# Patient Record
(demographics unavailable — no encounter records)

---

## 2024-10-18 NOTE — IMMUNIZATIONS
[Immunizations are up to date] : Immunizations are up to date [Records maintained by [facility]] : Records maintained by PETE [FreeTextEntry1] : Received flu vaccine on 09/19/24

## 2024-10-18 NOTE — END OF VISIT
[] : Fellow [FreeTextEntry3] : Agree with fellow as above.    I discussed this patient in a pre-clinic session with the fellow including review of clinical status, last labs, and relevant notes from other providers.  I also saw the patient and discussed history, completed an exam and discussed the treatment/management and follow-up together with the fellow.     [Time Spent: ___ minutes] : I have spent [unfilled] minutes of time on the encounter which excludes teaching and separately reported services.

## 2024-10-18 NOTE — REASON FOR VISIT
[Follow-Up: _____] : [unfilled] is  being seen for a [unfilled] follow-up visit [Patient] : patient [Mother] : mother [Interpreters_FullName] : Marleny Ham [Interpreters_Relationshiptopatient] : Physician [TWNoteComboBox1] : Malaysian

## 2024-10-18 NOTE — REVIEW OF SYSTEMS
[NI] : Endocrine [Nl] : Hematologic/Lymphatic [Joint Pains] : arthralgias [Joint Swelling] : joint swelling  [AM Stiffness] : am stiffness [Appropriate Age Development] : development appropriate for age [Fever] : no fever [Rash] : no rash [Eye Pain] : no eye pain [Redness] : no redness [Blurry Vision] : no blurred vision [Oral Ulcers] : no oral ulcers [Chest Pain] : no chest pain or discomfort [Shortness of Breath] : no shortness of breath [Change in Appetite] : no change in appetite [Vomiting] : no vomiting [Diarrhea] : no diarrhea [Abdominal Pain] : no abdominal pain [Constipation] : no constipation [Limping] : no limping [Back Pain] : ~T no back pain [Muscle Aches] : no muscle aches [Smokers in Home] : no one in home smokes

## 2024-10-18 NOTE — PHYSICAL EXAM
[PERRLA] : JAMIE [Lips] : normal lips [Oral] : normal oral cavity  [Mucosa] : moist and pink mucosa [Palate] : normal palate [S1, S2 Present] : S1, S2 present [Cardiac Auscultation] : normal cardiac auscultation  [Respiratory Effort] : normal respiratory effort [Clear to auscultation] : clear to auscultation [Soft] : soft [NonTender] : non tender [Non Distended] : non distended [Normal Bowel Sounds] : normal bowel sounds [No Hepatosplenomegaly] : no hepatosplenomegaly [No Abnormal Lymph Nodes Palpated] : no abnormal lymph nodes palpated [Range Of Motion] : full range of motion [Joint effusions] : joint effusions [Intact Judgement] : intact judgement  [Insight Insight] : intact insight [Not Examined] : not examined [5] : 5 [_______] : Ankle: [unfilled] [Acute distress] : no acute distress [Rash] : no rash [Erythematous Conjunctiva] : nonerythematous conjunctiva [Erythematous Oropharynx] : nonerythematous oropharynx [Ulcers] : no ulcers [Lesions] : no lesions [Erythematous] : not erythematous [Mass (___cm)] : no neck masses [Murmurs] : no murmurs [Peripheral Edema] : no peripheral edema  [de-identified] : effusion of multiple MCP and PIPs bilaterally, with tenderness.. stiffness of fingers on L hand. effusion of b/l knees and ankles.  nontender  [NumbJointsActiveArthritis] : 4 + multiple MCP and PIPs [de-identified] : non tender to palpation bilaterally [de-identified] : none

## 2024-10-18 NOTE — DATA REVIEWED
[FreeTextEntry1] : Labs September 2024 UPC 0.1 , UA normal  , CCP negative dsDNA , APLAs, Dejesus, SSB  negative  CRP, CMP normal  Sjogren's 7.3 RNP 3.6  C3(elevated), normal C4

## 2024-10-18 NOTE — CONSULT LETTER
[Dear  ___] : Dear  [unfilled], [Courtesy Letter:] : I had the pleasure of seeing your patient, [unfilled], in my office today. [Please see my note below.] : Please see my note below. [Consult Closing:] : Thank you very much for allowing me to participate in the care of this patient.  If you have any questions, please do not hesitate to contact me. [Sincerely,] : Sincerely, [FreeTextEntry2] : Guillermo Hutchinson MD  [address] [place], [place], [address] [FreeTextEntry3] : Marleny Ham MD Pediatric Rheumatology Fellow [facility]

## 2024-10-18 NOTE — REASON FOR VISIT
[Follow-Up: _____] : [unfilled] is  being seen for a [unfilled] follow-up visit [Patient] : patient [Mother] : mother [Interpreters_FullName] : Marleny Ham [Interpreters_Relationshiptopatient] : Physician [TWNoteComboBox1] : Malawian

## 2024-10-18 NOTE — HISTORY OF PRESENT ILLNESS
[Polyarticular RF Positive] : Polyarticular RF Positive [LEANN positive] : LEANN positive [RF positive] : RF positive [HLAB27 negative] : HLAB27 negative [No] : no glaucoma [Rheumatoid Arthritis] : Rheumatoid Arthritis [Unlimited ADLs] : able to do activities of daily living without limitations [Unlimited Sports] : able to participate in sports without limitations [4] : 4 [FreeTextEntry1] :  feels better but this week has had more pain in hands, knees and ankles. Worse on Tuesday lasting 2 days. Reports joint swelling throughout. Reports compliance with MTX and Naproxen. denies GI symptoms. AM stiffness 3 hours in the morning.  Currently has no pain.   No fever, rash, or recent illness.   No eye pain/redness/change in vision. Sometimes reports dry eyes.  Saw Optho on March 29, 2024 (mother does not recall info). No abnormalities except for astigmatism left eye as per mother. No oral ulcers. Deny alopecia or Raynaud's.  No difficulty chewing or swallowing. Denies dry mouth.   No chest pain or shortness of breath. Normal appetite.   No headaches.  No hematuria or blood in stool.   No other new symptoms.  [JIASubtypeDate] : 03/28/2024 [[facility] [Juvenile Rheumatoid Arthritis] : no Juvenile Rheumatoid Arthritis [Ankylosing Spondylitis] : no Ankylosing Spondylitis [Psoriasis] : no Psoriasis [Diabetes Mellitus (type 1 - insulin dependent)] : no Type 1 Diabetes Mellitus [Systemic Lupus Erythematosus] : no Systemic Lupus Erythematosus [Raynaud's Disease] : no Raynaud's Disease [IBD - Crohns] : no Crohn's Inflammatory Bowel disease [IBD - Ulcerative Colitis] : no Ulcerative Colitis Inflammatory Bowel Disease [Graves' Disease] : no Graves' Disease [Hashimoto's Thyroiditis] : no Hashimoto's Thyroiditis [Multiple Sclerosis] : no Multiple Sclerosis [de-identified] : PGM, Rheumatism in [facility], possible SLE. DiGeorge Syndrome- Younger brother, mother had similar symptoms

## 2024-10-18 NOTE — CONSULT LETTER
[Dear  ___] : Dear  [unfilled], [Courtesy Letter:] : I had the pleasure of seeing your patient, [unfilled], in my office today. [Please see my note below.] : Please see my note below. [Consult Closing:] : Thank you very much for allowing me to participate in the care of this patient.  If you have any questions, please do not hesitate to contact me. [Sincerely,] : Sincerely, [FreeTextEntry2] : Guillermo Hutchinson MD  [address] [place], [address] [FreeTextEntry3] : Marleny Ham MD Pediatric Rheumatology Fellow [facility]

## 2024-10-18 NOTE — HISTORY OF PRESENT ILLNESS
[Polyarticular RF Positive] : Polyarticular RF Positive [LEANN positive] : LEANN positive [RF positive] : RF positive [HLAB27 negative] : HLAB27 negative [No] : no glaucoma [Rheumatoid Arthritis] : Rheumatoid Arthritis [Unlimited ADLs] : able to do activities of daily living without limitations [Unlimited Sports] : able to participate in sports without limitations [4] : 4 [FreeTextEntry1] :  feels better but this week has had more pain in hands, knees and ankles. Worse on Tuesday lasting 2 days. Reports joint swelling throughout. Reports compliance with MTX and Naproxen. denies GI symptoms. AM stiffness 3 hours in the morning.  Currently has no pain.   No fever, rash, or recent illness.   No eye pain/redness/change in vision. Sometimes reports dry eyes.  Saw Optho on March 29, 2024 (mother does not recall info). No abnormalities except for astigmatism left eye as per mother. No oral ulcers. Deny alopecia or Raynaud's.  No difficulty chewing or swallowing. Denies dry mouth.   No chest pain or shortness of breath. Normal appetite.   No headaches.  No hematuria or blood in stool.   No other new symptoms.  [JIASubtypeDate] : 03/28/2024 [[facility] 60 [Juvenile Rheumatoid Arthritis] : no Juvenile Rheumatoid Arthritis [Ankylosing Spondylitis] : no Ankylosing Spondylitis [Psoriasis] : no Psoriasis [Diabetes Mellitus (type 1 - insulin dependent)] : no Type 1 Diabetes Mellitus [Systemic Lupus Erythematosus] : no Systemic Lupus Erythematosus [Raynaud's Disease] : no Raynaud's Disease [IBD - Crohns] : no Crohn's Inflammatory Bowel disease [IBD - Ulcerative Colitis] : no Ulcerative Colitis Inflammatory Bowel Disease [Graves' Disease] : no Graves' Disease [Hashimoto's Thyroiditis] : no Hashimoto's Thyroiditis [Multiple Sclerosis] : no Multiple Sclerosis [de-identified] : PGM, Rheumatism in [facility], possible SLE. DiGeorge Syndrome- Younger brother, mother had similar symptoms

## 2024-10-18 NOTE — PHYSICAL EXAM
[PERRLA] : JAMIE [Lips] : normal lips [Oral] : normal oral cavity  [Mucosa] : moist and pink mucosa [Palate] : normal palate [S1, S2 Present] : S1, S2 present [Cardiac Auscultation] : normal cardiac auscultation  [Respiratory Effort] : normal respiratory effort [Clear to auscultation] : clear to auscultation [Soft] : soft [NonTender] : non tender [Non Distended] : non distended [Normal Bowel Sounds] : normal bowel sounds [No Hepatosplenomegaly] : no hepatosplenomegaly [No Abnormal Lymph Nodes Palpated] : no abnormal lymph nodes palpated [Range Of Motion] : full range of motion [Joint effusions] : joint effusions [Intact Judgement] : intact judgement  [Insight Insight] : intact insight [Not Examined] : not examined [5] : 5 [_______] : Ankle: [unfilled] [Acute distress] : no acute distress [Rash] : no rash [Erythematous Conjunctiva] : nonerythematous conjunctiva [Erythematous Oropharynx] : nonerythematous oropharynx [Ulcers] : no ulcers [Lesions] : no lesions [Erythematous] : not erythematous [Mass (___cm)] : no neck masses [Murmurs] : no murmurs [Peripheral Edema] : no peripheral edema  [de-identified] : effusion of multiple MCP and PIPs bilaterally, with tenderness.. stiffness of fingers on L hand. effusion of b/l knees and ankles.  nontender  [NumbJointsActiveArthritis] : 4 + multiple MCP and PIPs [de-identified] : non tender to palpation bilaterally [de-identified] : none

## 2024-11-26 NOTE — REASON FOR VISIT
[Follow-Up: _____] : [unfilled] is  being seen for a [unfilled] follow-up visit [Patient] : patient [Mother] : mother [Interpreters_FullName] : Marleny Ham [Interpreters_Relationshiptopatient] : Physician [TWNoteComboBox1] : Mauritanian

## 2024-11-26 NOTE — HISTORY OF PRESENT ILLNESS
[Polyarticular RF Positive] : Polyarticular RF Positive [LEANN positive] : LEANN positive [RF positive] : RF positive [HLAB27 negative] : HLAB27 negative [No] : no glaucoma [Rheumatoid Arthritis] : Rheumatoid Arthritis [Unlimited ADLs] : able to do activities of daily living without limitations [Unlimited Sports] : able to participate in sports without limitations [0] : 0 [FreeTextEntry1] : Denies joint pain. Takes MTX on Sundays. Tolerating well without GI complaints. She is also on Naproxen daily. No joint swelling or AM stiffness.   No fever, rash, or recent illness.   No eye pain/redness/change in vision. Sometimes reports dry eyes.  Scheduled for optho follow up March 2025. No abnormalities except for astigmatism left eye as per mother. Needs to bring records.  No oral ulcers. Deny alopecia or Raynaud's.  No difficulty chewing or swallowing. Sometimes reports dry mouth.  No chest pain or shortness of breath. Normal appetite.  No headaches.  No hematuria or blood in stool.   No other new symptoms.   Optho appt March 2025 PMD appt on 12/10  [JIASubtypeDate] : 03/28/2024 [[facility] : [facility] [DurMorningStiffness] : 0 [Juvenile Rheumatoid Arthritis] : no Juvenile Rheumatoid Arthritis [Ankylosing Spondylitis] : no Ankylosing Spondylitis [Psoriasis] : no Psoriasis [Diabetes Mellitus (type 1 - insulin dependent)] : no Type 1 Diabetes Mellitus [Systemic Lupus Erythematosus] : no Systemic Lupus Erythematosus [Raynaud's Disease] : no Raynaud's Disease [IBD - Crohns] : no Crohn's Inflammatory Bowel disease [IBD - Ulcerative Colitis] : no Ulcerative Colitis Inflammatory Bowel Disease [Graves' Disease] : no Graves' Disease [Hashimoto's Thyroiditis] : no Hashimoto's Thyroiditis [Multiple Sclerosis] : no Multiple Sclerosis [de-identified] : PGM, Rheumatism in [facility], possible SLE. DiGeorge Syndrome- Younger brother, mother had similar symptoms

## 2024-11-26 NOTE — CONSULT LETTER
[Dear  ___] : Dear  [unfilled], [Courtesy Letter:] : I had the pleasure of seeing your patient, [unfilled], in my office today. [Please see my note below.] : Please see my note below. [Consult Closing:] : Thank you very much for allowing me to participate in the care of this patient.  If you have any questions, please do not hesitate to contact me. [Sincerely,] : Sincerely, [FreeTextEntry2] : Guillermo Hutchinson MD  [address] [place], [address] [FreeTextEntry3] : Marleny Ham MD Pediatric Rheumatology Fellow [facility]

## 2024-11-26 NOTE — CONSULT LETTER
[Dear  ___] : Dear  [unfilled], [Courtesy Letter:] : I had the pleasure of seeing your patient, [unfilled], in my office today. [Please see my note below.] : Please see my note below. [Consult Closing:] : Thank you very much for allowing me to participate in the care of this patient.  If you have any questions, please do not hesitate to contact me. [Sincerely,] : Sincerely, [FreeTextEntry2] : Guillermo Hutchinson MD  [address] [place], [address] [FreeTextEntry3] : Marleny Ham MD Pediatric Rheumatology Fellow [facility]

## 2024-11-26 NOTE — SOCIAL HISTORY
[Parent(s)] : parent(s) [___ Brothers] : [unfilled] brothers [___ Sisters] : [unfilled] sisters [Grade:  _____] : Grade: [unfilled] [FreeTextEntry1] : [employer]

## 2024-11-26 NOTE — SOCIAL HISTORY
[Parent(s)] : parent(s) [___ Brothers] : [unfilled] brothers [___ Sisters] : [unfilled] sisters [Grade:  _____] : Grade: [unfilled] [FreeTextEntry1] : [employer]

## 2024-11-26 NOTE — REASON FOR VISIT
Xray Chest 1 View- PORTABLE-Urgent [Follow-Up: _____] : [unfilled] is  being seen for a [unfilled] follow-up visit [Patient] : patient [Mother] : mother [Interpreters_FullName] : Marleny Ham [Interpreters_Relationshiptopatient] : Physician [TWNoteComboBox1] : English

## 2024-11-26 NOTE — PHYSICAL EXAM
[PERRLA] : JAMIE [Lips] : normal lips [Oral] : normal oral cavity  [Mucosa] : moist and pink mucosa [Palate] : normal palate [S1, S2 Present] : S1, S2 present [Cardiac Auscultation] : normal cardiac auscultation  [Respiratory Effort] : normal respiratory effort [Clear to auscultation] : clear to auscultation [Soft] : soft [NonTender] : non tender [Non Distended] : non distended [Normal Bowel Sounds] : normal bowel sounds [No Hepatosplenomegaly] : no hepatosplenomegaly [No Abnormal Lymph Nodes Palpated] : no abnormal lymph nodes palpated [Range Of Motion] : full range of motion [Joint effusions] : joint effusions [Intact Judgement] : intact judgement  [Insight Insight] : intact insight [Not Examined] : not examined [_______] : Ankle: [unfilled] [3] : 3 [Acute distress] : no acute distress [Rash] : no rash [Erythematous Conjunctiva] : nonerythematous conjunctiva [Erythematous Oropharynx] : nonerythematous oropharynx [Ulcers] : no ulcers [Lesions] : no lesions [Erythematous] : not erythematous [Mass (___cm)] : no neck masses [Murmurs] : no murmurs [Peripheral Edema] : no peripheral edema  [2] : 2 [de-identified] : effusion of multiple MCP and PIPs bilaterally, nontender [improved]. small effusion of b/l ankles.  nontender , full ROM of all joints  [NumbJointsActiveArthritis] : 2+  multiple MCP and PIPs [NumbJointsLimitedMotion] : 0 [de-identified] : non tender to palpation bilaterally [de-identified] : none

## 2024-11-26 NOTE — PHYSICAL EXAM
[PERRLA] : JAMIE [Lips] : normal lips [Oral] : normal oral cavity  [Mucosa] : moist and pink mucosa [Palate] : normal palate [S1, S2 Present] : S1, S2 present [Cardiac Auscultation] : normal cardiac auscultation  [Respiratory Effort] : normal respiratory effort [Clear to auscultation] : clear to auscultation [Soft] : soft [NonTender] : non tender [Non Distended] : non distended [Normal Bowel Sounds] : normal bowel sounds [No Hepatosplenomegaly] : no hepatosplenomegaly [No Abnormal Lymph Nodes Palpated] : no abnormal lymph nodes palpated [Range Of Motion] : full range of motion [Joint effusions] : joint effusions [Intact Judgement] : intact judgement  [Insight Insight] : intact insight [Not Examined] : not examined [_______] : Ankle: [unfilled] [3] : 3 [Acute distress] : no acute distress [Rash] : no rash [Erythematous Conjunctiva] : nonerythematous conjunctiva [Erythematous Oropharynx] : nonerythematous oropharynx [Ulcers] : no ulcers [Lesions] : no lesions [Erythematous] : not erythematous [Mass (___cm)] : no neck masses [Murmurs] : no murmurs [Peripheral Edema] : no peripheral edema  [2] : 2 [de-identified] : effusion of multiple MCP and PIPs bilaterally, nontender [improved]. small effusion of b/l ankles.  nontender , full ROM of all joints  [NumbJointsActiveArthritis] : 2+  multiple MCP and PIPs [NumbJointsLimitedMotion] : 0 [de-identified] : non tender to palpation bilaterally [de-identified] : none

## 2024-11-26 NOTE — HISTORY OF PRESENT ILLNESS
[Polyarticular RF Positive] : Polyarticular RF Positive [LEANN positive] : LEANN positive [RF positive] : RF positive [HLAB27 negative] : HLAB27 negative [No] : no glaucoma [Rheumatoid Arthritis] : Rheumatoid Arthritis [Unlimited ADLs] : able to do activities of daily living without limitations [Unlimited Sports] : able to participate in sports without limitations [0] : 0 [FreeTextEntry1] : Denies joint pain. Takes MTX on Sundays. Tolerating well without GI complaints. She is also on Naproxen daily. No joint swelling or AM stiffness.   No fever, rash, or recent illness.   No eye pain/redness/change in vision. Sometimes reports dry eyes.  Scheduled for optho follow up March 2025. No abnormalities except for astigmatism left eye as per mother. Needs to bring records.  No oral ulcers. Deny alopecia or Raynaud's.  No difficulty chewing or swallowing. Sometimes reports dry mouth.  No chest pain or shortness of breath. Normal appetite.  No headaches.  No hematuria or blood in stool.   No other new symptoms.   Optho appt March 2025 PMD appt on 12/10  [JIASubtypeDate] : 03/28/2024 [[facility] [DurMorningStiffness] : 0 [Juvenile Rheumatoid Arthritis] : no Juvenile Rheumatoid Arthritis [Ankylosing Spondylitis] : no Ankylosing Spondylitis [Psoriasis] : no Psoriasis [Diabetes Mellitus (type 1 - insulin dependent)] : no Type 1 Diabetes Mellitus [Systemic Lupus Erythematosus] : no Systemic Lupus Erythematosus [Raynaud's Disease] : no Raynaud's Disease [IBD - Crohns] : no Crohn's Inflammatory Bowel disease [IBD - Ulcerative Colitis] : no Ulcerative Colitis Inflammatory Bowel Disease [Graves' Disease] : no Graves' Disease [Hashimoto's Thyroiditis] : no Hashimoto's Thyroiditis [Multiple Sclerosis] : no Multiple Sclerosis [de-identified] : PGM, Rheumatism in [facility], possible SLE. DiGeorge Syndrome- Younger brother, mother had similar symptoms

## 2024-11-26 NOTE — REVIEW OF SYSTEMS
[NI] : Endocrine [Appropriate Age Development] : development appropriate for age [Nl] : Musculoskeletal [Fever] : no fever [Rash] : no rash [Eye Pain] : no eye pain [Redness] : no redness [Blurry Vision] : no blurred vision [Oral Ulcers] : no oral ulcers [Chest Pain] : no chest pain or discomfort [Shortness of Breath] : no shortness of breath [Change in Appetite] : no change in appetite [Vomiting] : no vomiting [Diarrhea] : no diarrhea [Abdominal Pain] : no abdominal pain [Constipation] : no constipation [Limping] : no limping [Joint Pains] : no arthralgias [Joint Swelling] : no joint swelling [Back Pain] : ~T no back pain [Muscle Aches] : no muscle aches [AM Stiffness] : no am stiffness [Smokers in Home] : no one in home smokes

## 2025-01-17 NOTE — PHYSICAL EXAM
[PERRLA] : JAMIE [Lips] : normal lips [Oral] : normal oral cavity  [Mucosa] : moist and pink mucosa [Palate] : normal palate [S1, S2 Present] : S1, S2 present [Cardiac Auscultation] : normal cardiac auscultation  [Respiratory Effort] : normal respiratory effort [Clear to auscultation] : clear to auscultation [Soft] : soft [NonTender] : non tender [Non Distended] : non distended [Normal Bowel Sounds] : normal bowel sounds [No Hepatosplenomegaly] : no hepatosplenomegaly [No Abnormal Lymph Nodes Palpated] : no abnormal lymph nodes palpated [Range Of Motion] : full range of motion [Joint effusions] : joint effusions [Intact Judgement] : intact judgement  [Insight Insight] : intact insight [Not Examined] : not examined [2] : 2 [_______] : Ankle: [unfilled] [1] : 1 [Acute distress] : no acute distress [Rash] : no rash [Erythematous Conjunctiva] : nonerythematous conjunctiva [Erythematous Oropharynx] : nonerythematous oropharynx [Ulcers] : no ulcers [Lesions] : no lesions [Erythematous] : not erythematous [Mass (___cm)] : no neck masses [Murmurs] : no murmurs [Peripheral Edema] : no peripheral edema  [de-identified] : synovial hypertrophy of multiple MCP and PIPs bilaterally, nontender, small effusion of b/l ankles.  nontender , full ROM of all joints  [NumbJointsActiveArthritis] : 2 [NumbJointsLimitedMotion] : 0 [de-identified] : none [de-identified] : non tender to palpation bilaterally

## 2025-01-17 NOTE — HISTORY OF PRESENT ILLNESS
[Polyarticular RF Positive] : Polyarticular RF Positive [LEANN positive] : LEANN positive [RF positive] : RF positive [HLAB27 negative] : HLAB27 negative [No] : no glaucoma [0] : 0 [Rheumatoid Arthritis] : Rheumatoid Arthritis [Unlimited ADLs] : able to do activities of daily living without limitations [Unlimited Sports] : able to participate in sports without limitations [FreeTextEntry1] : Denies joint pain. Takes MTX on Sundays. Tolerating well without GI complaints. Was using Naproxen daily. this week has not used it. No joint swelling or AM stiffness.   No fever, rash, or recent illness.   No eye pain/redness/change in vision. Sometimes reports dry eyes with itching. has tried artificial drops when she has discomfort with relief.   Scheduled for optho([facility] ophthalmology in [place]) follow up March 2025. No abnormalities except for astigmatism left eye as per mother. Needs to bring records.  No oral ulcers. Deny alopecia or Raynaud's.  No difficulty chewing or swallowing. Sometimes reports dry mouth.  No chest pain or shortness of breath. Normal appetite.  No headaches.  No hematuria or blood in stool.   No other new symptoms.   Optho appt March 2025  Current meds MTX 25mg qweekly (on Sundays) Leucovorin 5mg weekly  HCQ 300mg daily  [JIASubtypeDate] : 03/28/2024 [[facility] [DurMorningStiffness] : 0 [Juvenile Rheumatoid Arthritis] : no Juvenile Rheumatoid Arthritis [Ankylosing Spondylitis] : no Ankylosing Spondylitis [Psoriasis] : no Psoriasis [Diabetes Mellitus (type 1 - insulin dependent)] : no Type 1 Diabetes Mellitus [Systemic Lupus Erythematosus] : no Systemic Lupus Erythematosus [Raynaud's Disease] : no Raynaud's Disease [IBD - Crohns] : no Crohn's Inflammatory Bowel disease [IBD - Ulcerative Colitis] : no Ulcerative Colitis Inflammatory Bowel Disease [Graves' Disease] : no Graves' Disease [Multiple Sclerosis] : no Multiple Sclerosis [Hashimoto's Thyroiditis] : no Hashimoto's Thyroiditis [de-identified] : PGM, Rheumatism in [facility], possible SLE. DiGeorge Syndrome- Younger brother, mother had similar symptoms

## 2025-01-17 NOTE — REVIEW OF SYSTEMS
[NI] : Endocrine [Nl] : Hematologic/Lymphatic [Appropriate Age Development] : development appropriate for age [Fever] : no fever [Rash] : no rash [Eye Pain] : no eye pain [Redness] : no redness [Blurry Vision] : no blurred vision [Oral Ulcers] : no oral ulcers [Chest Pain] : no chest pain or discomfort [Shortness of Breath] : no shortness of breath [Change in Appetite] : no change in appetite [Vomiting] : no vomiting [Diarrhea] : no diarrhea [Abdominal Pain] : no abdominal pain [Constipation] : no constipation [Limping] : no limping [Joint Pains] : no arthralgias [Joint Swelling] : no joint swelling [Back Pain] : ~T no back pain [Muscle Aches] : no muscle aches [AM Stiffness] : no am stiffness [Smokers in Home] : no one in home smokes

## 2025-01-17 NOTE — CONSULT LETTER
[Dear  ___] : Dear  [unfilled], [Courtesy Letter:] : I had the pleasure of seeing your patient, [unfilled], in my office today. [Please see my note below.] : Please see my note below. [Consult Closing:] : Thank you very much for allowing me to participate in the care of this patient.  If you have any questions, please do not hesitate to contact me. [Sincerely,] : Sincerely, [FreeTextEntry2] : Guillermo Hutchinson MD  [address] [place], [place], [address] [FreeTextEntry3] : Marleny Ham MD Pediatric Rheumatology Fellow [facility]

## 2025-01-17 NOTE — REASON FOR VISIT
[Follow-Up: _____] : [unfilled] is  being seen for a [unfilled] follow-up visit [Patient] : patient [Mother] : mother [Interpreters_FullName] : Marleny Ham [Interpreters_Relationshiptopatient] : Physician [TWNoteComboBox1] : Gibraltarian

## 2025-01-17 NOTE — REASON FOR VISIT
[Follow-Up: _____] : [unfilled] is  being seen for a [unfilled] follow-up visit [Patient] : patient [Mother] : mother [Interpreters_FullName] : Marleny Ham [Interpreters_Relationshiptopatient] : Physician [TWNoteComboBox1] : Ethiopian

## 2025-01-17 NOTE — SOCIAL HISTORY
[Parent(s)] : parent(s) [___ Brothers] : [unfilled] brothers [___ Sisters] : [unfilled] sisters [Grade:  _____] : Grade: [unfilled] [FreeTextEntry1] : [employer]

## 2025-01-17 NOTE — SOCIAL HISTORY
[Parent(s)] : parent(s) [___ Brothers] : [unfilled] brothers [___ Sisters] : [unfilled] sisters [Grade:  _____] : Grade: [unfilled] [FreeTextEntry1] : [employer]

## 2025-01-17 NOTE — PHYSICAL EXAM
[PERRLA] : JAMIE [Lips] : normal lips [Oral] : normal oral cavity  [Mucosa] : moist and pink mucosa [Palate] : normal palate [S1, S2 Present] : S1, S2 present [Cardiac Auscultation] : normal cardiac auscultation  [Respiratory Effort] : normal respiratory effort [Clear to auscultation] : clear to auscultation [Soft] : soft [NonTender] : non tender [Non Distended] : non distended [Normal Bowel Sounds] : normal bowel sounds [No Hepatosplenomegaly] : no hepatosplenomegaly [No Abnormal Lymph Nodes Palpated] : no abnormal lymph nodes palpated [Range Of Motion] : full range of motion [Joint effusions] : joint effusions [Intact Judgement] : intact judgement  [Insight Insight] : intact insight [Not Examined] : not examined [2] : 2 [_______] : Ankle: [unfilled] [1] : 1 [Acute distress] : no acute distress [Rash] : no rash [Erythematous Conjunctiva] : nonerythematous conjunctiva [Erythematous Oropharynx] : nonerythematous oropharynx [Ulcers] : no ulcers [Lesions] : no lesions [Erythematous] : not erythematous [Mass (___cm)] : no neck masses [Murmurs] : no murmurs [Peripheral Edema] : no peripheral edema  [de-identified] : synovial hypertrophy of multiple MCP and PIPs bilaterally, nontender, small effusion of b/l ankles.  nontender , full ROM of all joints  [NumbJointsActiveArthritis] : 2 [NumbJointsLimitedMotion] : 0 [de-identified] : none [de-identified] : non tender to palpation bilaterally

## 2025-01-17 NOTE — HISTORY OF PRESENT ILLNESS
[Polyarticular RF Positive] : Polyarticular RF Positive [LEANN positive] : LEANN positive [RF positive] : RF positive [HLAB27 negative] : HLAB27 negative [No] : no glaucoma [0] : 0 [Rheumatoid Arthritis] : Rheumatoid Arthritis [Unlimited ADLs] : able to do activities of daily living without limitations [Unlimited Sports] : able to participate in sports without limitations [FreeTextEntry1] : Denies joint pain. Takes MTX on Sundays. Tolerating well without GI complaints. Was using Naproxen daily. this week has not used it. No joint swelling or AM stiffness.   No fever, rash, or recent illness.   No eye pain/redness/change in vision. Sometimes reports dry eyes with itching. has tried artificial drops when she has discomfort with relief.   Scheduled for optho([facility] ophthalmology in [place]) follow up March 2025. No abnormalities except for astigmatism left eye as per mother. Needs to bring records.  No oral ulcers. Deny alopecia or Raynaud's.  No difficulty chewing or swallowing. Sometimes reports dry mouth.  No chest pain or shortness of breath. Normal appetite.  No headaches.  No hematuria or blood in stool.   No other new symptoms.   Optho appt March 2025  Current meds MTX 25mg qweekly (on Sundays) Leucovorin 5mg weekly  HCQ 300mg daily  [JIASubtypeDate] : 03/28/2024 [[facility] : [facility] [DurMorningStiffness] : 0 [Juvenile Rheumatoid Arthritis] : no Juvenile Rheumatoid Arthritis [Ankylosing Spondylitis] : no Ankylosing Spondylitis [Psoriasis] : no Psoriasis [Diabetes Mellitus (type 1 - insulin dependent)] : no Type 1 Diabetes Mellitus [Systemic Lupus Erythematosus] : no Systemic Lupus Erythematosus [Raynaud's Disease] : no Raynaud's Disease [IBD - Crohns] : no Crohn's Inflammatory Bowel disease [IBD - Ulcerative Colitis] : no Ulcerative Colitis Inflammatory Bowel Disease [Graves' Disease] : no Graves' Disease [Hashimoto's Thyroiditis] : no Hashimoto's Thyroiditis [Multiple Sclerosis] : no Multiple Sclerosis [de-identified] : PGM, Rheumatism in [facility], possible SLE. DiGeorge Syndrome- Younger brother, mother had similar symptoms

## 2025-01-17 NOTE — CONSULT LETTER
[Dear  ___] : Dear  [unfilled], [Courtesy Letter:] : I had the pleasure of seeing your patient, [unfilled], in my office today. [Please see my note below.] : Please see my note below. [Consult Closing:] : Thank you very much for allowing me to participate in the care of this patient.  If you have any questions, please do not hesitate to contact me. [Sincerely,] : Sincerely, [FreeTextEntry2] : Guillermo Hutchinson MD  [address] [place], [address] [FreeTextEntry3] : Marleny Ham MD Pediatric Rheumatology Fellow [facility]

## 2025-03-02 NOTE — CONSULT LETTER
[Dear  ___] : Dear  [unfilled], [Courtesy Letter:] : I had the pleasure of seeing your patient, [unfilled], in my office today. [Please see my note below.] : Please see my note below. [Consult Closing:] : Thank you very much for allowing me to participate in the care of this patient.  If you have any questions, please do not hesitate to contact me. [Sincerely,] : Sincerely, [FreeTextEntry2] : Ileana Yanes MD  [address] [place] [address]  [FreeTextEntry3] : Marleny Ham MD Pediatric Rheumatology Fellow [facility]

## 2025-03-02 NOTE — PHYSICAL EXAM
[PERRLA] : JAMIE [Lips] : normal lips [Oral] : normal oral cavity  [Mucosa] : moist and pink mucosa [Palate] : normal palate [S1, S2 Present] : S1, S2 present [Cardiac Auscultation] : normal cardiac auscultation  [Respiratory Effort] : normal respiratory effort [Clear to auscultation] : clear to auscultation [Soft] : soft [NonTender] : non tender [Non Distended] : non distended [Normal Bowel Sounds] : normal bowel sounds [No Hepatosplenomegaly] : no hepatosplenomegaly [No Abnormal Lymph Nodes Palpated] : no abnormal lymph nodes palpated [Range Of Motion] : full range of motion [Joint effusions] : joint effusions [Intact Judgement] : intact judgement  [Insight Insight] : intact insight [Not Examined] : not examined [1] : 1 [_______] : Ankle: [unfilled] [Acute distress] : no acute distress [Rash] : no rash [Erythematous Conjunctiva] : nonerythematous conjunctiva [Erythematous Oropharynx] : nonerythematous oropharynx [Ulcers] : no ulcers [Lesions] : no lesions [Erythematous] : not erythematous [Mass (___cm)] : no neck masses [Murmurs] : no murmurs [Peripheral Edema] : no peripheral edema  [de-identified] : synovial hypertrophy of multiple MCP and PIPs bilaterally, nontender, small effusion of b/l ankles.  nontender , full ROM of all joints  [NumbJointsActiveArthritis] : 2 [NumbJointsLimitedMotion] : 0 [de-identified] : non tender to palpation bilaterally [de-identified] : none

## 2025-03-02 NOTE — REASON FOR VISIT
[Follow-Up: _____] : [unfilled] is  being seen for a [unfilled] follow-up visit [Patient] : patient [Mother] : mother [Interpreters_FullName] : Marleny Ham [Interpreters_Relationshiptopatient] : Physician [TWNoteComboBox1] : [place]an

## 2025-03-02 NOTE — SOCIAL HISTORY
[Parent(s)] : parent(s) [___ Brothers] : [unfilled] brothers [___ Sisters] : [unfilled] sisters [Grade:  _____] : Grade: [unfilled] [FreeTextEntry1] : [employer]

## 2025-03-02 NOTE — REASON FOR VISIT
[Follow-Up: _____] : [unfilled] is  being seen for a [unfilled] follow-up visit [Patient] : patient [Mother] : mother [Interpreters_FullName] : Marleny Ham [Interpreters_Relationshiptopatient] : Physician [TWNoteComboBox1] : [place]orese

## 2025-03-02 NOTE — PHYSICAL EXAM
[PERRLA] : JAMIE [Lips] : normal lips [Oral] : normal oral cavity  [Mucosa] : moist and pink mucosa [Palate] : normal palate [S1, S2 Present] : S1, S2 present [Cardiac Auscultation] : normal cardiac auscultation  [Respiratory Effort] : normal respiratory effort [Clear to auscultation] : clear to auscultation [Soft] : soft [NonTender] : non tender [Non Distended] : non distended [Normal Bowel Sounds] : normal bowel sounds [No Hepatosplenomegaly] : no hepatosplenomegaly [No Abnormal Lymph Nodes Palpated] : no abnormal lymph nodes palpated [Range Of Motion] : full range of motion [Joint effusions] : joint effusions [Intact Judgement] : intact judgement  [Insight Insight] : intact insight [Not Examined] : not examined [1] : 1 [_______] : Ankle: [unfilled] [Acute distress] : no acute distress [Rash] : no rash [Erythematous Conjunctiva] : nonerythematous conjunctiva [Erythematous Oropharynx] : nonerythematous oropharynx [Ulcers] : no ulcers [Lesions] : no lesions [Erythematous] : not erythematous [Mass (___cm)] : no neck masses [Murmurs] : no murmurs [Peripheral Edema] : no peripheral edema  [de-identified] : synovial hypertrophy of multiple MCP and PIPs bilaterally, nontender, small effusion of b/l ankles.  nontender , full ROM of all joints  [NumbJointsActiveArthritis] : 2 [NumbJointsLimitedMotion] : 0 [de-identified] : non tender to palpation bilaterally [de-identified] : none

## 2025-03-02 NOTE — CONSULT LETTER
[Dear  ___] : Dear  [unfilled], [Courtesy Letter:] : I had the pleasure of seeing your patient, [unfilled], in my office today. [Please see my note below.] : Please see my note below. [Consult Closing:] : Thank you very much for allowing me to participate in the care of this patient.  If you have any questions, please do not hesitate to contact me. [Sincerely,] : Sincerely, [FreeTextEntry2] : Ileana Yanes MD  [address] [place] [address]  [FreeTextEntry3] : Marleny Ham MD Pediatric Rheumatology Fellow [facility]

## 2025-03-02 NOTE — END OF VISIT
[] : Fellow [FreeTextEntry3] : MCTD.  PFT grossly wnl per pulmonary (see scanned records). Labs for disease and drug toxicity monitoring as ordered. Plan well outlined per Dr Ham above.  I discussed this patient in a pre-clinic session with the fellow including clinical status and last set of laboratory testing results. I also saw the patient and discussed history, completed an exam and discussed the plan together with the fellow. Total time spent today included reviewing prior notes, results, and time with patient/parent. Time spent teaching and/or on separate procedures was not counted toward this total time. Time spent -   41   minutes

## 2025-03-02 NOTE — SOCIAL HISTORY
[Parent(s)] : parent(s) [___ Brothers] : [unfilled] brothers [___ Sisters] : [unfilled] sisters [Grade:  _____] : Grade: [unfilled] [FreeTextEntry1] : [employer]

## 2025-03-02 NOTE — HISTORY OF PRESENT ILLNESS
[Polyarticular RF Positive] : Polyarticular RF Positive [LEANN positive] : LEANN positive [RF positive] : RF positive [HLAB27 negative] : HLAB27 negative [No] : no glaucoma [0] : 0 [Rheumatoid Arthritis] : Rheumatoid Arthritis [Unlimited ADLs] : able to do activities of daily living without limitations [Unlimited Sports] : able to participate in sports without limitations [FreeTextEntry1] : Denies joint pain. Takes MTX on weekends.  Tolerating well without GI complaints. Was using Naproxen PRN. No joint swelling or AM stiffness.   No fever, rash, or recent illness.   No eye pain/redness/change in vision. Denies dry eyes or dry mouth.   Scheduled for optho([facility] ophthalmology in [place]) follow up March 2025. No abnormalities except for astigmatism left eye as per mother. Needs to bring records.  No oral ulcers. Deny alopecia or Raynaud's.  No difficulty chewing or swallowing.   No chest pain or shortness of breath. Normal appetite.  No headaches.  No hematuria or blood in stool.   No other new symptoms.   Optho appt March 2025 went to pul today for PFTs - results pending.   Current meds MTX 25mg qweekly (on weekends) Leucovorin 5mg weekly  HCQ 300mg daily  [JIASubtypeDate] : 03/28/2024 [[facility] : [facility] [DurMorningStiffness] : 0 [Juvenile Rheumatoid Arthritis] : no Juvenile Rheumatoid Arthritis [Ankylosing Spondylitis] : no Ankylosing Spondylitis [Psoriasis] : no Psoriasis [Diabetes Mellitus (type 1 - insulin dependent)] : no Type 1 Diabetes Mellitus [Systemic Lupus Erythematosus] : no Systemic Lupus Erythematosus [Raynaud's Disease] : no Raynaud's Disease [IBD - Crohns] : no Crohn's Inflammatory Bowel disease [IBD - Ulcerative Colitis] : no Ulcerative Colitis Inflammatory Bowel Disease [Graves' Disease] : no Graves' Disease [Hashimoto's Thyroiditis] : no Hashimoto's Thyroiditis [Multiple Sclerosis] : no Multiple Sclerosis [de-identified] : PGM, Rheumatism in [facility], possible SLE. DiGeorge Syndrome- Younger brother, mother had similar symptoms

## 2025-03-02 NOTE — HISTORY OF PRESENT ILLNESS
[Polyarticular RF Positive] : Polyarticular RF Positive [LEANN positive] : LEANN positive [RF positive] : RF positive [HLAB27 negative] : HLAB27 negative [No] : no glaucoma [0] : 0 [Rheumatoid Arthritis] : Rheumatoid Arthritis [Unlimited ADLs] : able to do activities of daily living without limitations [Unlimited Sports] : able to participate in sports without limitations [FreeTextEntry1] : Denies joint pain. Takes MTX on weekends.  Tolerating well without GI complaints. Was using Naproxen PRN. No joint swelling or AM stiffness.   No fever, rash, or recent illness.   No eye pain/redness/change in vision. Denies dry eyes or dry mouth.   Scheduled for optho([facility] ophthalmology in [place]) follow up March 2025. No abnormalities except for astigmatism left eye as per mother. Needs to bring records.  No oral ulcers. Deny alopecia or Raynaud's.  No difficulty chewing or swallowing.   No chest pain or shortness of breath. Normal appetite.  No headaches.  No hematuria or blood in stool.   No other new symptoms.   Optho appt March 2025 went to pul today for PFTs - results pending.   Current meds MTX 25mg qweekly (on weekends) Leucovorin 5mg weekly  HCQ 300mg daily  [JIASubtypeDate] : 03/28/2024 [[facility] : [facility] [DurMorningStiffness] : 0 [Juvenile Rheumatoid Arthritis] : no Juvenile Rheumatoid Arthritis [Ankylosing Spondylitis] : no Ankylosing Spondylitis [Psoriasis] : no Psoriasis [Diabetes Mellitus (type 1 - insulin dependent)] : no Type 1 Diabetes Mellitus [Systemic Lupus Erythematosus] : no Systemic Lupus Erythematosus [Raynaud's Disease] : no Raynaud's Disease [IBD - Crohns] : no Crohn's Inflammatory Bowel disease [IBD - Ulcerative Colitis] : no Ulcerative Colitis Inflammatory Bowel Disease [Graves' Disease] : no Graves' Disease [Hashimoto's Thyroiditis] : no Hashimoto's Thyroiditis [Multiple Sclerosis] : no Multiple Sclerosis [de-identified] : PGM, Rheumatism in [facility], possible SLE. DiGeorge Syndrome- Younger brother, mother had similar symptoms

## 2025-03-19 NOTE — PHYSICAL EXAM
[General Appearance - Alert] : alert [General Appearance - In No Acute Distress] : in no acute distress [General Appearance - Well Nourished] : well nourished [General Appearance - Well Developed] : well developed [General Appearance - Well-Appearing] : well appearing [Appearance Of Head] : the head was normocephalic [Facies] : there were no dysmorphic facial features [Sclera] : the conjunctiva were normal [Outer Ear] : the ears and nose were normal in appearance [Examination Of The Oral Cavity] : mucous membranes were moist and pink [Auscultation Breath Sounds / Voice Sounds] : breath sounds clear to auscultation bilaterally [Normal Chest Appearance] : the chest was normal in appearance [Apical Impulse] : quiet precordium with normal apical impulse [Heart Rate And Rhythm] : normal heart rate and rhythm [Heart Sounds] : normal S1 and S2 [No Murmur] : no murmurs  [Heart Sounds Gallop] : no gallops [Heart Sounds Pericardial Friction Rub] : no pericardial rub [Edema] : no edema [Arterial Pulses] : normal upper and lower extremity pulses with no pulse delay [Heart Sounds Click] : no clicks [Capillary Refill Test] : normal capillary refill [Bowel Sounds] : normal bowel sounds [Abdomen Soft] : soft [Nondistended] : nondistended [Abdomen Tenderness] : non-tender [Nail Clubbing] : no clubbing  or cyanosis of the fingers [Musculoskeletal - Swelling] : no joint swelling or joint tenderness [Cervical Lymph Nodes Enlarged Anterior] : The anterior cervical nodes were normal [Cervical Lymph Nodes Enlarged Posterior] : The posterior cervical nodes were normal [] : no rash [Skin Lesions] : no lesions [Skin Turgor] : normal turgor [Demonstrated Behavior - Infant Nonreactive To Parents] : interactive [Mood] : mood and affect were appropriate for age [Demonstrated Behavior] : normal behavior

## 2025-03-19 NOTE — REASON FOR VISIT
[Initial Consultation] : an initial consultation for [Noncardiac Disease] : cardiovascular evaluation  [Patient] : patient [Mother] : mother [Language Line ] : provided by Language Line   [Time Spent: ____ minutes] : Total time spent using  services: [unfilled] minutes. The patient's primary language is not English thus required  services. [Interpreters_IDNumber] : [id number] [Interpreters_FullName] : ginger [TWNoteComboBox1] : Omani

## 2025-03-19 NOTE — HISTORY OF PRESENT ILLNESS
[FreeTextEntry1] : I had the opportunity to examine Yeny, a 14-year-old female with juvenile idiopathic arthritis referred for cardiac evaluation.  Her medications are listed below and consists of methotrexate, hydroxy chloroquine sulfate, and leucovorin.  There are no known allergies.  She denies any cardiovascular complaints such as chest pain, cyanosis, chronic cough, excessive sweating, exercise intolerance, or syncope.  She is currently in the ninth grade.  Her serology is positive for LEANN, SSA, rheumatoid factor.  She was born in [facility] after full-term pregnancy normal spontaneous vaginal livery of breath 3 and 4 kg.  Father 40 mother 45 are both in good health is a 23-year-old female sibling in the in good health is an 8-year-old brother with DiGeorge syndrome without cardiac involvement.  She is currently in the ninth grade.

## 2025-03-19 NOTE — CARDIOLOGY SUMMARY
[Today's Date] : [unfilled] [FreeTextEntry1] : Sinus rhythm, rate 71 bpm, QRS axis was 91 degrees, ID 0.15, QRS 0.08, QTc 0.42 seconds and is within normal limits. [FreeTextEntry2] : Summary:  1. Acceleration of left pulmonary artery flow velocity to 2 m/sec without discrete narrowing in the     vessel.     The LPA measures mildly hypoplastic for body surface area by limited imaging. 2. Normal right ventricular morphology with qualitatively normal size and systolic function. 3. Normal left ventricular size, morphology and systolic function. 4. Aortic sinuses of Valsalva dimension (systole) = 2.42 cm (z = -1.53). 5. Ascending aorta dimension (systole) = 2.6 cm (z = -0.02). 6. The ascending aorta in cross section (PSAX) at the level of the right pulmonary artery measures:      2.6 cm. 7. No pericardial effusion

## 2025-03-19 NOTE — CONSULT LETTER
[Today's Date] : [unfilled] [Name] : Name: [unfilled] [] : : ~~ [Today's Date:] : [unfilled] [Dear  ___:] : Dear Dr. [unfilled]: [Consult - Single Provider] : Thank you very much for allowing me to participate in the care of this patient. If you have any questions, please do not hesitate to contact me. [Sincerely,] : Sincerely, [DrKatiuska  ___] : Dr. DAILY [FreeTextEntry4] : Dr. Jorge Yanes [FreeTextEntry5] : [id number] [FreeTextEntry6] : [place] [address] [de-identified] : Tariq Borjas MD, FAAP, FACC, FAHA Chief Emeritus, Division of Pediatric Cardiology [facility] Professor, Department of Pediatrics, [facility]

## 2025-03-19 NOTE — CONSULT LETTER
[Today's Date] : [unfilled] [Name] : Name: [unfilled] [] : : ~~ [Today's Date:] : [unfilled] [Dear  ___:] : Dear Dr. [unfilled]: [Consult - Single Provider] : Thank you very much for allowing me to participate in the care of this patient. If you have any questions, please do not hesitate to contact me. [Sincerely,] : Sincerely, [DrKatiuska  ___] : Dr. DAILY [FreeTextEntry4] : Dr. Jorge Yanes [FreeTextEntry5] : [id number] [FreeTextEntry6] : [place] [address] [de-identified] : Tariq Borjas MD, FAAP, FACC, FAHA Chief Emeritus, Division of Pediatric Cardiology [facility] Professor, Department of Pediatrics, [facility]

## 2025-03-19 NOTE — HISTORY OF PRESENT ILLNESS
[FreeTextEntry1] : I had the opportunity to examine Yeny, a 14-year-old female with juvenile idiopathic arthritis referred for cardiac evaluation.  Her medications are listed below and consists of methotrexate, hydroxy chloroquine sulfate, and leucovorin.  There are no known allergies.  She denies any cardiovascular complaints such as chest pain, cyanosis, chronic cough, excessive sweating, exercise intolerance, or syncope.  She is currently in the ninth grade.  Her serology is positive for LEANN, SSA, rheumatoid factor.  She was born in [facility] after full-term pregnancy normal spontaneous vaginal livery of breath 3 and 4 kg.  Father 40 mother 45 are both in good health is a 23-year-old female sibling in the in good health is an 8-year-old brother with DiGeorge syndrome without cardiac involvement.  She is currently in the ninth grade.

## 2025-03-19 NOTE — REASON FOR VISIT
[Initial Consultation] : an initial consultation for [Noncardiac Disease] : cardiovascular evaluation  [Patient] : patient [Mother] : mother [Language Line ] : provided by Language Line   [Time Spent: ____ minutes] : Total time spent using  services: [unfilled] minutes. The patient's primary language is not English thus required  services. [Interpreters_IDNumber] : [id number] [Interpreters_FullName] : ginger [TWNoteComboBox1] : Turkish

## 2025-03-19 NOTE — CARDIOLOGY SUMMARY
[Today's Date] : [unfilled] [FreeTextEntry1] : Sinus rhythm, rate 71 bpm, QRS axis was 91 degrees, KS 0.15, QRS 0.08, QTc 0.42 seconds and is within normal limits. [FreeTextEntry2] : Summary:  1. Acceleration of left pulmonary artery flow velocity to 2 m/sec without discrete narrowing in the     vessel.     The LPA measures mildly hypoplastic for body surface area by limited imaging. 2. Normal right ventricular morphology with qualitatively normal size and systolic function. 3. Normal left ventricular size, morphology and systolic function. 4. Aortic sinuses of Valsalva dimension (systole) = 2.42 cm (z = -1.53). 5. Ascending aorta dimension (systole) = 2.6 cm (z = -0.02). 6. The ascending aorta in cross section (PSAX) at the level of the right pulmonary artery measures:      2.6 cm. 7. No pericardial effusion

## 2025-05-12 NOTE — DATA REVIEWED
[FreeTextEntry1] : Labs February 24 2025  CMP, CRP wnl  ESR 31 increased from 15  CBC grossly wnl  UA /UPC wnl (0.1)

## 2025-05-12 NOTE — PHYSICAL EXAM
[PERRLA] : JAMIE [Lips] : normal lips [Oral] : normal oral cavity  [Mucosa] : moist and pink mucosa [Palate] : normal palate [S1, S2 Present] : S1, S2 present [Cardiac Auscultation] : normal cardiac auscultation  [Respiratory Effort] : normal respiratory effort [Clear to auscultation] : clear to auscultation [Soft] : soft [NonTender] : non tender [Non Distended] : non distended [Normal Bowel Sounds] : normal bowel sounds [No Hepatosplenomegaly] : no hepatosplenomegaly [No Abnormal Lymph Nodes Palpated] : no abnormal lymph nodes palpated [Range Of Motion] : full range of motion [Intact Judgement] : intact judgement  [Insight Insight] : intact insight [Not Examined] : not examined [1] : 1 [Acute distress] : no acute distress [Rash] : no rash [Erythematous Conjunctiva] : nonerythematous conjunctiva [Erythematous Oropharynx] : nonerythematous oropharynx [Ulcers] : no ulcers [Lesions] : no lesions [Erythematous] : not erythematous [Mass (___cm)] : no neck masses [Murmurs] : no murmurs [Peripheral Edema] : no peripheral edema  [de-identified] : synovial hypertrophy of multiple MCP and PIPs bilaterally, nontender, fullness of b/l ankles and knees. full ROM of all joints  [_______] : Knee: [unfilled] [NumbJointsLimitedMotion] : 0 [de-identified] : non tender to palpation bilaterally [de-identified] : none

## 2025-05-12 NOTE — CONSULT LETTER
[Dear  ___] : Dear  [unfilled], [Courtesy Letter:] : I had the pleasure of seeing your patient, [unfilled], in my office today. [Please see my note below.] : Please see my note below. [Consult Closing:] : Thank you very much for allowing me to participate in the care of this patient.  If you have any questions, please do not hesitate to contact me. [Sincerely,] : Sincerely, [FreeTextEntry2] : Ileana Yanes MD  [address] [place] [address]  [FreeTextEntry3] : Marleny Ham MD Pediatric Rheumatology Fellow [facility]

## 2025-05-12 NOTE — HISTORY OF PRESENT ILLNESS
[Polyarticular RF Positive] : Polyarticular RF Positive [LEANN positive] : LEANN positive [RF positive] : RF positive [HLAB27 negative] : HLAB27 negative [No] : no glaucoma [0] : 0 [Rheumatoid Arthritis] : Rheumatoid Arthritis [Unlimited ADLs] : able to do activities of daily living without limitations [Unlimited Sports] : able to participate in sports without limitations [FreeTextEntry1] : here for follow up. Occasionally has R wrist and L shoulder pain. Plays badOptifreeze.  Has not taken MTX in 1.5 months. Did not  refills.  Using Naproxen PRN 1x a week. No joint swelling or AM stiffness.   No fever, rash, or recent illness.   No eye pain/redness/change in vision. Denies dry eyes or dry mouth. Saw optho([facility] ophthalmology in [place]) March 2025. No abnormalities as per mother. Did not bring paperwork.  No oral ulcers. Deny alopecia or Raynaud's.  No difficulty chewing or swallowing.   No chest pain or shortness of breath. Normal appetite.  No headaches.  No hematuria or blood in stool.   No other new symptoms.   02/24/25- PFTs normal.  cardio seen 3/19/25- EKG normal, ECHO normal. follow up 1 year.   Current meds MTX 25mg qweekly - stopped  Leucovorin 5mg weekly HCQ 300mg daily  Naproxen PRN  [JIASubtypeDate] : 03/28/2024 [[facility] [DurMorningStiffness] : 0 [Juvenile Rheumatoid Arthritis] : no Juvenile Rheumatoid Arthritis [Ankylosing Spondylitis] : no Ankylosing Spondylitis [Psoriasis] : no Psoriasis [Diabetes Mellitus (type 1 - insulin dependent)] : no Type 1 Diabetes Mellitus [Systemic Lupus Erythematosus] : no Systemic Lupus Erythematosus [Raynaud's Disease] : no Raynaud's Disease [IBD - Crohns] : no Crohn's Inflammatory Bowel disease [IBD - Ulcerative Colitis] : no Ulcerative Colitis Inflammatory Bowel Disease [Graves' Disease] : no Graves' Disease [Hashimoto's Thyroiditis] : no Hashimoto's Thyroiditis [Multiple Sclerosis] : no Multiple Sclerosis [de-identified] : PGM, Rheumatism in [facility], possible SLE. DiGeorge Syndrome- Younger brother, mother had similar symptoms

## 2025-05-12 NOTE — REASON FOR VISIT
[Follow-Up: _____] : [unfilled] is  being seen for a [unfilled] follow-up visit [Patient] : patient [Mother] : mother [Interpreters_FullName] : Marleny Ham [Interpreters_Relationshiptopatient] : Physician [TWNoteComboBox1] : Sri Lankan

## 2025-05-12 NOTE — SOCIAL HISTORY
[Parent(s)] : parent(s) [___ Brothers] : [unfilled] brothers [___ Sisters] : [unfilled] sisters [Grade:  _____] : Grade: [unfilled] [FreeTextEntry1] : [employer]

## 2025-07-01 NOTE — CONSULT LETTER
[Dear  ___] : Dear  [unfilled], [Courtesy Letter:] : I had the pleasure of seeing your patient, [unfilled], in my office today. [Please see my note below.] : Please see my note below. [Consult Closing:] : Thank you very much for allowing me to participate in the care of this patient.  If you have any questions, please do not hesitate to contact me. [Sincerely,] : Sincerely, [FreeTextEntry2] : Ileana Yanes MD  [address] [place] [address]  [FreeTextEntry3] : Marleny Ham MD Pediatric Rheumatology Fellow [facility]

## 2025-07-01 NOTE — HISTORY OF PRESENT ILLNESS
[Polyarticular RF Positive] : Polyarticular RF Positive [LEANN positive] : LEANN positive [RF positive] : RF positive [HLAB27 negative] : HLAB27 negative [No] : no glaucoma [0] : 0 [Rheumatoid Arthritis] : Rheumatoid Arthritis [Unlimited ADLs] : able to do activities of daily living without limitations [Unlimited Sports] : able to participate in sports without limitations [FreeTextEntry1] : here for follow up. Occasionally has R wrist and L shoulder pain. Plays badappAttach.  Has not taken MTX in 1.5 months. Did not  refills.  Using Naproxen PRN 1x a week. No joint swelling or AM stiffness.   No fever, rash, or recent illness.   No eye pain/redness/change in vision. Denies dry eyes or dry mouth. Saw optho([facility] in [place]) March 2025. No abnormalities as per mother. Did not bring paperwork.  No oral ulcers. Deny alopecia or Raynaud's.  No difficulty chewing or swallowing.   No chest pain or shortness of breath. Normal appetite.  No headaches.  No hematuria or blood in stool.   No other new symptoms.   02/24/25- PFTs normal.  cardio seen 3/19/25- EKG normal, ECHO normal. follow up 1 year.   Current meds MTX 25mg qweekly - stopped  Leucovorin 5mg weekly HCQ 300mg daily  Naproxen PRN  [JIASubtypeDate] : 03/28/2024 [facility] [DurMorningStiffness] : 0 [Juvenile Rheumatoid Arthritis] : no Juvenile Rheumatoid Arthritis [Ankylosing Spondylitis] : no Ankylosing Spondylitis [Psoriasis] : no Psoriasis [Diabetes Mellitus (type 1 - insulin dependent)] : no Type 1 Diabetes Mellitus [Systemic Lupus Erythematosus] : no Systemic Lupus Erythematosus [Raynaud's Disease] : no Raynaud's Disease [IBD - Crohns] : no Crohn's Inflammatory Bowel disease [IBD - Ulcerative Colitis] : no Ulcerative Colitis Inflammatory Bowel Disease [Graves' Disease] : no Graves' Disease [Hashimoto's Thyroiditis] : no Hashimoto's Thyroiditis [Multiple Sclerosis] : no Multiple Sclerosis [de-identified] : PGM, Rheumatism in [facility], possible SLE. DiGeorge Syndrome- Younger brother, mother had similar symptoms

## 2025-07-01 NOTE — SOCIAL HISTORY
[Parent(s)] : parent(s) [___ Brothers] : [unfilled] brothers [___ Sisters] : [unfilled] sisters [Grade:  _____] : Grade: [unfilled] [FreeTextEntry1] : [employer]

## 2025-07-01 NOTE — PHYSICAL EXAM
[PERRLA] : JAMIE [Lips] : normal lips [Oral] : normal oral cavity  [Mucosa] : moist and pink mucosa [Palate] : normal palate [S1, S2 Present] : S1, S2 present [Cardiac Auscultation] : normal cardiac auscultation  [Respiratory Effort] : normal respiratory effort [Clear to auscultation] : clear to auscultation [Soft] : soft [NonTender] : non tender [Non Distended] : non distended [Normal Bowel Sounds] : normal bowel sounds [No Hepatosplenomegaly] : no hepatosplenomegaly [No Abnormal Lymph Nodes Palpated] : no abnormal lymph nodes palpated [Range Of Motion] : full range of motion [Intact Judgement] : intact judgement  [Insight Insight] : intact insight [Not Examined] : not examined [1] : 1 [_______] : Ankle: [unfilled] [Acute distress] : no acute distress [Rash] : no rash [Erythematous Conjunctiva] : nonerythematous conjunctiva [Erythematous Oropharynx] : nonerythematous oropharynx [Ulcers] : no ulcers [Lesions] : no lesions [Erythematous] : not erythematous [Mass (___cm)] : no neck masses [Murmurs] : no murmurs [Peripheral Edema] : no peripheral edema  [de-identified] : synovial hypertrophy of multiple MCP and PIPs bilaterally, nontender, fullness of b/l ankles and knees. full ROM of all joints  [NumbJointsLimitedMotion] : 0 [de-identified] : non tender to palpation bilaterally [de-identified] : none